# Patient Record
Sex: MALE | Race: WHITE | NOT HISPANIC OR LATINO | Employment: OTHER | URBAN - METROPOLITAN AREA
[De-identification: names, ages, dates, MRNs, and addresses within clinical notes are randomized per-mention and may not be internally consistent; named-entity substitution may affect disease eponyms.]

---

## 2023-05-24 ENCOUNTER — APPOINTMENT (OUTPATIENT)
Dept: GENERAL RADIOLOGY | Facility: HOSPITAL | Age: 73
End: 2023-05-24
Payer: MEDICARE

## 2023-05-24 ENCOUNTER — HOSPITAL ENCOUNTER (EMERGENCY)
Facility: HOSPITAL | Age: 73
Discharge: HOME | End: 2023-05-24
Attending: EMERGENCY MEDICINE | Admitting: EMERGENCY MEDICINE
Payer: MEDICARE

## 2023-05-24 VITALS
SYSTOLIC BLOOD PRESSURE: 156 MMHG | OXYGEN SATURATION: 94 % | HEART RATE: 84 BPM | DIASTOLIC BLOOD PRESSURE: 76 MMHG | HEIGHT: 66 IN | WEIGHT: 180 LBS | RESPIRATION RATE: 18 BRPM | TEMPERATURE: 97.5 F | BODY MASS INDEX: 28.93 KG/M2

## 2023-05-24 DIAGNOSIS — M54.41 ACUTE RIGHT-SIDED LOW BACK PAIN WITH RIGHT-SIDED SCIATICA: Primary | ICD-10-CM

## 2023-05-24 PROCEDURE — 96372 THER/PROPH/DIAG INJ SC/IM: CPT

## 2023-05-24 PROCEDURE — 25010000002 KETOROLAC TROMETHAMINE PER 15 MG: Performed by: EMERGENCY MEDICINE

## 2023-05-24 PROCEDURE — 99283 EMERGENCY DEPT VISIT LOW MDM: CPT

## 2023-05-24 PROCEDURE — 73502 X-RAY EXAM HIP UNI 2-3 VIEWS: CPT

## 2023-05-24 RX ORDER — KETOROLAC TROMETHAMINE 30 MG/ML
30 INJECTION, SOLUTION INTRAMUSCULAR; INTRAVENOUS ONCE
Status: COMPLETED | OUTPATIENT
Start: 2023-05-24 | End: 2023-05-24

## 2023-05-24 RX ORDER — PREDNISONE 20 MG/1
TABLET ORAL
Qty: 19 TABLET | Refills: 0 | Status: SHIPPED | OUTPATIENT
Start: 2023-05-24

## 2023-05-24 RX ORDER — HYDROCODONE BITARTRATE AND ACETAMINOPHEN 5; 325 MG/1; MG/1
1 TABLET ORAL EVERY 6 HOURS PRN
Status: DISCONTINUED | OUTPATIENT
Start: 2023-05-24 | End: 2023-05-24 | Stop reason: HOSPADM

## 2023-05-24 RX ORDER — CYCLOBENZAPRINE HCL 10 MG
10 TABLET ORAL 3 TIMES DAILY PRN
Qty: 30 TABLET | Refills: 0 | Status: SHIPPED | OUTPATIENT
Start: 2023-05-24

## 2023-05-24 RX ADMIN — HYDROCODONE BITARTRATE AND ACETAMINOPHEN 1 TABLET: 5; 325 TABLET ORAL at 15:12

## 2023-05-24 RX ADMIN — KETOROLAC TROMETHAMINE 30 MG: 30 INJECTION, SOLUTION INTRAMUSCULAR at 15:12

## 2023-05-24 NOTE — ED NOTES
Patient to ER via car from home for R hip pain x 2 days  Patient was seen yesterday by PCP states it hasnt gotten better  KNI

## 2023-05-24 NOTE — ED TRIAGE NOTES
Pt to ED via PV. Pt c/o right hip pain x 3 days. pt states was prescribed robaxin yesterday without relief. Pt denies injury. Pt denies numbness, tingling or loss of control of bowel or bladder.

## 2023-05-24 NOTE — ED PROVIDER NOTES
EMERGENCY DEPARTMENT ENCOUNTER    Room Number:  T01/01  Date of encounter:  5/24/2023  PCP: Juan Manuel Boogie MD  Patient Care Team:  Juan Manuel Boogie MD as PCP - General (Internal Medicine)   Independent Historians: Patient    HPI:  Chief Complaint: Hip pain   A complete HPI/ROS/PMH/PSH/SH/FH are unobtainable due to: None    Chronic or social conditions impacting patient care (social determinants of health): None    Context: Stephen Blandon is a 72 y.o. male who presents to the ED c/o right low back and hip discomfort over the past 3 days.  Patient has been taking muscle relaxers without much relief.  He denies having any falls or known injuries.  He is not having saddle anesthesia or incontinence.  He denies any numbness or weakness to the lower extremities.  The pain is worse with movement.    Review of prior external notes (non-ED): No external notes available for review.    Review of prior external test results outside of this encounter: No external lab or imaging results available for review.    PAST MEDICAL HISTORY  Active Ambulatory Problems     Diagnosis Date Noted   • No Active Ambulatory Problems     Resolved Ambulatory Problems     Diagnosis Date Noted   • No Resolved Ambulatory Problems     No Additional Past Medical History       The patient has a COVID HM Topic on their chart, and they are fully vaccinated.    PAST SURGICAL HISTORY  No past surgical history on file.      FAMILY HISTORY  No family history on file.      SOCIAL HISTORY  Social History     Socioeconomic History   • Marital status:          ALLERGIES  Sulfa antibiotics        REVIEW OF SYSTEMS  Review of Systems   Constitutional: Negative for chills and fever.   Respiratory: Negative for shortness of breath.    Cardiovascular: Negative for chest pain and leg swelling.   Musculoskeletal: Positive for back pain. Negative for neck pain.   Skin: Negative.    Neurological: Negative for weakness and numbness.        All systems reviewed and  negative except for those discussed in HPI.       PHYSICAL EXAM    I have reviewed the triage vital signs and nursing notes.    ED Triage Vitals [05/24/23 1452]   Temp Heart Rate Resp BP SpO2   97.5 °F (36.4 °C) 84 18 -- 94 %      Temp src Heart Rate Source Patient Position BP Location FiO2 (%)   -- -- -- -- --       Physical Exam  GENERAL: alert, no acute distress  SKIN: Warm, dry  HENT: Normocephalic, atraumatic  EYES: no scleral icterus  CV: regular rhythm, regular rate  RESPIRATORY: normal effort, lungs clear  ABDOMEN: soft, nontender, nondistended  MUSCULOSKELETAL: no deformity, right-sided lumbosacral tenderness to palpation, normal sensation  NEURO: alert, moves all extremities, follows commands, no focal deficits          LAB RESULTS  No results found for this or any previous visit (from the past 24 hour(s)).    Ordered the above labs and independently reviewed and interpreted the results.        RADIOLOGY  XR Hip With or Without Pelvis 2 - 3 View Right   Final Result   1. No acute process.       This report was finalized on 5/24/2023 3:58 PM by Dr. Armani Boggs M.D.              I ordered the above noted radiological studies. Independently reviewed and interpreted by me.  See dictation for official radiology interpretation.      PROCEDURES    Procedures      MEDICATIONS GIVEN IN ER    Medications   ketorolac (TORADOL) injection 30 mg (30 mg Intramuscular Given 5/24/23 1512)         PROGRESS, DATA ANALYSIS, CONSULTS, AND MEDICAL DECISION MAKING    All labs have been independently reviewed and interpreted by me.  All radiology studies have been independently reviewed and interpreted by me and discussed with radiologist dictating the report.   EKG's independently reviewed and interpreted by me.  Discussion below represents my analysis of pertinent findings related to patient's condition, differential diagnosis, treatment plan and final disposition.    My differential diagnosis for back pain includes but  is not limited to:  Musculoskeletal strain, contusion, retroperitoneal hematoma, disc protrusion, vertebral fracture, transverse process fracture, rib fracture, facet syndrome, sacroiliac joint strain, sciatica, renal injury, splenic injury, pancreatic injury, osteoarthritis, lumbar spondylosis, spinal stenosis, ankylosing spondylitis, sacroiliac joint inflammation, pancreatitis, perforated peptic ulcer, diverticulitis, epidural abscess, osteomyelitis, retroperitoneal abscess, pyelonephritis, pneumonia, subphrenic abscess, tuberculosis, neurofibroma, meningioma, multiple myeloma, lymphoma, metastatic cancer, primary cancer, AAA, aortic dissection, spinal ischemia, referred pain, ureterolithiasis      ED Course as of 05/25/23 1728   Wed May 24, 2023   1954 XR Hip With or Without Pelvis 2 - 3 View Right  Radiology report reviewed, no evidence of any acute emergent findings [DC]      ED Course User Index  [DC] Jt Kee MD           PPE: Patient was placed in face mask in first look. Patient was wearing facemask when I entered the room and throughout our encounter. I wore full protective equipment throughout this patient encounter including a face mask, and gloves. Hand hygiene was performed before donning protective equipment and after removal when leaving the room.        AS OF 17:28 EDT VITALS:    BP - 156/76  HR - 84  TEMP - 97.5 °F (36.4 °C)  O2 SATS - 94%        DIAGNOSIS  Final diagnoses:   Acute right-sided low back pain with right-sided sciatica         DISPOSITION  ED Disposition     ED Disposition   Discharge    Condition   Stable    Comment   --                Note Disclaimer: At [facility], we believe that sharing information builds trust and better relationships. You are receiving this note because you recently visited [facility]. It is possible you will see health information before a provider has talked with you about it. This kind of information can be easy to misunderstand. To help you  fully understand what it means for your health, we urge you to discuss this note with your provider.       Arina Hernandez PA  05/25/23 2033

## 2023-05-24 NOTE — ED PROVIDER NOTES
Pt presents to the ED c/o  right low back and hip discomfort for the last 3 days, has been on muscle relaxers without significant relief.  No direct fall or trauma, no saddle anesthesia or urinary or fecal incontinence or retention.  No lower extremity numbness or weakness.  Pain is worse with certain positional change and movement.     On exam,   General: No acute distress, nontoxic  HEENT: EOMI  Pulm: Symmetric chest rise, nonlabored breathing  CV: Regular rate and rhythm  GI: Nondistended  MSK: No deformity, reproducible pain in the right lumbosacral area to palpation  Skin: Warm, dry  Neuro: Awake, alert, oriented x 4, moving all extremities, no focal deficits  Psych: Calm, cooperative    Vital signs and nursing notes reviewed.           Plan:   ED Course as of 05/24/23 1954   Wed May 24, 2023   1954 XR Hip With or Without Pelvis 2 - 3 View Right  Radiology report reviewed, no evidence of any acute emergent findings [DC]      ED Course User Index  [DC] Jt Kee MD     Suspect a musculoskeletal etiology at this time with no evidence of any acute emergent red flags on history or exam or testing that warrant further hospital work-up or stay.  Supportive care measures moving forward, outpatient follow-up, ED return for worsening symptoms as needed.  All questions and concerns addressed.     Attestation:  The MARCUS and I have discussed this patient's history, physical exam, and treatment plan.  I have reviewed the documentation and personally had a face to face interaction with the patient. I affirm the documentation and agree with the treatment and plan.  The attached note describes my personal findings.            Jt Kee MD  05/24/23 1954